# Patient Record
Sex: FEMALE | URBAN - NONMETROPOLITAN AREA
[De-identification: names, ages, dates, MRNs, and addresses within clinical notes are randomized per-mention and may not be internally consistent; named-entity substitution may affect disease eponyms.]

---

## 2019-02-11 ENCOUNTER — IMPORTED ENCOUNTER (OUTPATIENT)
Dept: URBAN - NONMETROPOLITAN AREA CLINIC 1 | Facility: CLINIC | Age: 24
End: 2019-02-11

## 2019-02-11 PROCEDURE — 92015 DETERMINE REFRACTIVE STATE: CPT

## 2019-02-11 PROCEDURE — 92014 COMPRE OPH EXAM EST PT 1/>: CPT

## 2019-02-11 NOTE — PATIENT DISCUSSION
Astigmatism-Discussed diagnosis with patient. Myopia-Discussed diagnosis with patient. -Explained that people who are myopic are at a higher risk for developing RD/RT and reviewed associated S&S.-Pt to contact our office if symptoms develop. Updated spec Rx given.

## 2021-06-23 ENCOUNTER — IMPORTED ENCOUNTER (OUTPATIENT)
Dept: URBAN - NONMETROPOLITAN AREA CLINIC 1 | Facility: CLINIC | Age: 26
End: 2021-06-23

## 2021-06-23 PROCEDURE — 92015 DETERMINE REFRACTIVE STATE: CPT

## 2021-06-23 PROCEDURE — 92014 COMPRE OPH EXAM EST PT 1/>: CPT

## 2021-06-23 NOTE — PATIENT DISCUSSION
"Astigmatism-Discussed diagnosis with patient. Myopia-Discussed diagnosis with patient. -Explained that people who are myopic are at a higher risk for developing RD/RT and reviewed associated S&S.-Pt to contact our office if symptoms develop. ""Updated spec Rx given. Recommend lens that will provide comfort as well as protect safety and health of eyes. "

## 2022-04-09 ASSESSMENT — TONOMETRY
OS_IOP_MMHG: 15
OD_IOP_MMHG: 15
OS_IOP_MMHG: 15
OD_IOP_MMHG: 15

## 2022-04-09 ASSESSMENT — VISUAL ACUITY
OU_CC: J1+
OS_SC: 20/20-1
OD_SC: 20/20
OS_SC: 20/20
OD_SC: 20/20-1

## 2022-08-03 ENCOUNTER — HOSPITAL ENCOUNTER (OUTPATIENT)
Dept: NUTRITION | Age: 27
Discharge: HOME | End: 2022-08-03
Payer: COMMERCIAL

## 2022-08-03 PROCEDURE — 97802 MEDICAL NUTRITION INDIV IN: CPT

## 2022-08-11 NOTE — PROGRESS NOTES
[facility] [place] - Outpatient Nutrition Services  [address] [place] [address] Zoila Michaels 19, [address]  Phone: [phone] Fax: [phone]   Nutrition Assessment - Medical Nutrition Therapy   Outpatient Initial Evaluation         Patient Name: Unknown Darron : 1995   Treatment Diagnosis: Abnormal weight gain   Referral Source: Andrea Sandoval [facility]): 2022     Gender: female Age: 32 y.o. Ht: 63 in Wt: 148.6 lb  kg   BMI: 26.3 BMR   Male  BMR Female [id number]     Past Medical History:  hypothyroid     Pertinent Medications:   synthroid     Biochemical Data:   No results found for: HBA1C, ITA5AHJN, WHU6KLQF  Lab Results   Component Value Date/Time    Sodium 140 2017 08:57 AM    Potassium 4.2 2017 08:57 AM    Chloride 106 2017 08:57 AM    CO2 29 2017 08:57 AM    Glucose 84 2017 08:57 AM    BUN 10 2017 08:57 AM    Creatinine 0.8 2017 08:57 AM    GFR est AA >60.0 2017 08:57 AM    GFR est non-AA >60 2017 08:57 AM    Calcium 9.2 2017 08:57 AM    Bilirubin, total 0.3 2017 08:57 AM    Alk. phosphatase 88 2017 08:57 AM    Protein, total 7.7 2017 08:57 AM    Albumin 3.9 2017 08:57 AM    ALT (SGPT) 2017 08:57 AM    AST (SGOT) 14 (L) 2017 08:57 AM     No results found for: CHOL, CHOLPOCT, CHOLX, CHLST, CHOLV, TOTCHOLEXT, HDL, HDLPOC, HDLEXT, HDLP, LDL, LDLCPOC, LDLCEXT, LDLC, DLDLP, VLDLC, VLDL, TGLX, TRIGL, TRIGLYCEXT, TRIGP, TGLPOCT, CHHD, CHHDX  Lab Results   Component Value Date/Time    ALT (SGPT) 2017 08:57 AM    AST (SGOT) 14 (L) 2017 08:57 AM    Alk.  phosphatase 88 2017 08:57 AM    Bilirubin, total 0.3 2017 08:57 AM     Lab Results   Component Value Date/Time    Creatinine 0.8 2017 08:57 AM     Lab Results   Component Value Date/Time    BUN 10 2017 08:57 AM     No results found for: MCACR, MCA1, MCA2, MCA3, MCAU, MCAU2, MCALPOCT     Assessment:    Pt referred to nutrition counseling for assistance with weight loss. Pt previously successful losing weight using Nut[employer]ystem, but would like to incorporate her own meals and snacks back into her lifestyle. Pt reported 35-40 lb weight gain over the past 3-4 years as she became more sedentary with a job change and now [employer]. Pt also diagnosed with hypothyroid 2 years ago. Pt lives alone and does not have a consistent exercise routine in place. . Food & Nutrition: Pt using NS for past 3.5 weeks. Eating 900 calories/day. Pt admits to increased snacking (in the past) after dinner. Additionally, pt recently cut back on her alcohol intake to social occasions only (was 1 drink/d). Pt does not eat fish or eat raw vegetables. Pt's current diet low in calories and lacking consistency with macronutrient composition and meal timing, leading to inefficient RMR. Estimate Needs   Calories:  1200 Protein: 90 Carbs: 120 Fat: 40   Kcal/day  g/day  g/day  g/day        percent: 30  40  30               Nutrition Diagnosis Abnormal weight gain related to inconsistent energy and macronutrient intake and lack of physical inactivity as evidenced by  pt diet and exercise recall. Nutrition Intervention &  Education: Provided macronutrient education, including optimal times to eat throughout the day and appropriate balance of macronutrients to promote more efficient RMR. Discussed the importance of developing a consistent lifestyle, including eating habits for long term weight loss and management. Provided pt with meal builder and diet plan.       Handouts Provided: []  Carbohydrates  []  Protein  []  Non-starchy Vegatbles  []  Food Label  []  Meal and Snack Ideas  []  Food Journals []  Diabetes  []  Cholesterol  []  Sodium  [x]  Meal Builder  [x]  Diet Plan  []  Others:   Information Reviewed with: pt   Readiness to Change Stage: []  Pre-contemplative    []  Contemplative  []  Preparation               [x] Action                  []  Maintenance   Potential Barriers to Learning: []  Decline in memory    []  Language barrier   []  Other:  []  Emotional                  []  Limited mobility  []  Lack of motivation     [] Vision, hearing or cognitive impairment   Expected Compliance: Good /      Nutritional Goal - To promote lifestyle changes to result in:    [x]  Weight loss  []  Improved diabetic control  []  Decreased cholesterol levels  []  Decreased blood pressure  []  Weight maintenance []  Preventing any interactions associated with food allergies  []  Adequate weight gain toward goal weight  []  Other:        Patient Goals:   1. Balance carb and protein at every meal/snack  2.  Eat 2-3 hrs after snacks and 4-5 hrs after meals      Dietitian Signature: Kaylah James MS, RD Date: 8/11/2022   Follow-up: 8/25 Time: 10:32 AM

## 2022-08-31 ENCOUNTER — APPOINTMENT (OUTPATIENT)
Dept: NUTRITION | Age: 27
End: 2022-08-31
Payer: COMMERCIAL

## 2022-09-08 ENCOUNTER — HOSPITAL ENCOUNTER (OUTPATIENT)
Dept: NUTRITION | Age: 27
Discharge: HOME | End: 2022-09-08
Payer: COMMERCIAL

## 2022-09-08 PROCEDURE — 97803 MED NUTRITION INDIV SUBSEQ: CPT

## 2022-09-08 NOTE — PROGRESS NOTES
NUTRITION - FOLLOW-UP TREATMENT NOTE  Patient Name: Petr Mcdaniels         Date: 2022  : 1995    YES/NO Patient  Verified  Diagnosis: abnormal weight gain   In time:   100             Out time:   130   Total Treatment Time (min):   30     SUBJECTIVE/ASSESSMENT    Current Wt: 148 Previous Wt: 148. 6 Wt Change: -0.6     Initial Wt:  Total Wt change:        Changes in medication or medical history? Any new allergies, surgeries or procedures? NO    If yes, update Summary List                Nutrition Diagnosis        Diagnosis Status: Abnormal weight gain related to inconsistent energy and macronutrient intake and lack of physical inactivity as evidenced by  pt diet and exercise recall. [x]  Improved []  No Change    []  Declined        Nutrition Monitoring and Evaluation: Pt reported lowest weight 146.2 (2 days ago). Pt feels increase in energy over past month. Pt now in school full time and has not had time to incorporate exercise yet. Discussed the importance of consistency with overall intake, but christi total carbohydrate. Reviewed label reading and encouraged pt to measure her starch portions.           Patient Education:  [x]  Review current plan with patient   []  Other:    Handouts/  Information Provided: []  Carbohydrates  []  Protein  []  Fiber  []  Serving Sizes  []  Fluids  []  General guidelines []  Diabetes  []  Cholesterol  []  Sodium  []  SBGM  []  Food Journals  []  Others:        Patient Goals Read nutrition labels and use 20-30g total carbohydrate as serving guideline  Measure starch portions as necessary  Keep food log       PLAN    [x]  Continue on current plan []  Follow-up PRN   []  Discharge due to :    [x]  Next appt: 10/12     Dietitian: Brinda Jordan MS, RD    Date: 2022 Time: 1:55 PM

## 2022-10-12 ENCOUNTER — APPOINTMENT (OUTPATIENT)
Dept: NUTRITION | Age: 27
End: 2022-10-12
Payer: COMMERCIAL

## 2022-10-19 ENCOUNTER — APPOINTMENT (OUTPATIENT)
Dept: NUTRITION | Age: 27
End: 2022-10-19
Payer: COMMERCIAL

## 2022-10-20 ENCOUNTER — HOSPITAL ENCOUNTER (OUTPATIENT)
Dept: NUTRITION | Age: 27
Discharge: HOME | End: 2022-10-20
Payer: COMMERCIAL

## 2022-10-20 PROCEDURE — 97803 MED NUTRITION INDIV SUBSEQ: CPT

## 2022-11-09 NOTE — PROGRESS NOTES
NUTRITION - FOLLOW-UP TREATMENT NOTE  Patient Name: Chance Angel         Date: 2022  : 1995    YES/NO Patient  Verified  Diagnosis:abnormal weight gain   In time:   1000             Out time:   1030   Total Treatment Time (min):   30     SUBJECTIVE/ASSESSMENT    Current Wt: 141.4 Previous Wt: 148 Wt Change: -6.6     Initial Wt:  Total Wt change:        Changes in medication or medical history? Any new allergies, surgeries or procedures? NO    If yes, update Summary List                Nutrition Diagnosis        Diagnosis Status: Abnormal weight gain related to inconsistent energy and macronutrient intake and lack of physical inactivity as evidenced by  pt diet and exercise recall. [x]  Improved []  No Change    []  Declined        Nutrition Monitoring and Evaluation: Pt following most recommendations as prescribed. Pt c/o increased fatigue, likely R/T stress of school. Discussed importance of food as fuel christi for the brain. Also encouraged pt to use food journals for an extra source of accountability. Patient Education:  []  Review current plan with patient   []  Other:    Handouts/  Information Provided: []  Carbohydrates  []  Protein  []  Fiber  []  Serving Sizes  []  Fluids  []  General guidelines []  Diabetes  []  Cholesterol  []  Sodium  []  SBGM  []  Food Journals  []  Others:        Patient Goals        PLAN    []  Continue on current plan []  Follow-up PRN   [x]  Discharge due to :  Outpatient nutrition services terminated at [facility]   []  Next appt:      Dietitian: Martin Souza MS, RD    Date: 2022 Time: 12:06 PM